# Patient Record
Sex: FEMALE | Race: BLACK OR AFRICAN AMERICAN | NOT HISPANIC OR LATINO | Employment: UNEMPLOYED | URBAN - METROPOLITAN AREA
[De-identification: names, ages, dates, MRNs, and addresses within clinical notes are randomized per-mention and may not be internally consistent; named-entity substitution may affect disease eponyms.]

---

## 2019-04-15 ENCOUNTER — TRANSCRIBE ORDERS (OUTPATIENT)
Dept: PHYSICAL THERAPY | Facility: HOSPITAL | Age: 74
End: 2019-04-15

## 2019-04-15 ENCOUNTER — HOSPITAL ENCOUNTER (OUTPATIENT)
Dept: PHYSICAL THERAPY | Facility: HOSPITAL | Age: 74
Setting detail: THERAPIES SERIES
Discharge: HOME | End: 2019-04-15

## 2019-04-15 DIAGNOSIS — M70.60 TENDINITIS OF TROCHANTERIC REGION OF HIP, UNSPECIFIED LATERALITY: Primary | ICD-10-CM

## 2019-04-15 DIAGNOSIS — M47.816 LUMBAR SPONDYLOSIS: ICD-10-CM

## 2019-04-15 PROCEDURE — 97161 PT EVAL LOW COMPLEX 20 MIN: CPT

## 2019-04-15 NOTE — THERAPY EVALUATION
Outpatient Physical Therapy Ortho Initial Evaluation  [facility]     Patient Name: Melody Rico  : 1945  MRN: [id number]  Today's Date: 4/15/2019      Visit Date: 04/15/2019    There is no problem list on file for this patient.       No past medical history on file.     No past surgical history on file.    Visit Dx:     ICD-10-CM ICD-9-CM   1. Tendinitis of trochanteric region of hip, unspecified laterality M70.60 726.5   2. Lumbar spondylosis M47.816 721.3         Patient History     Row Name 04/15/19 0828             History    Chief Complaint  Pain  -AC      Type of Pain  Back pain;Hip pain  -      Date Current Problem(s) Began  -- 2016  -      Brief Description of Current Complaint  Pt had onset of L hip pain in 2016, and eventually spread to low back and bilateral hips.  Aggravated by needing to perform a bowel movement, and states it takes her a couple days to pass.  Also has pain after passing a BM, and takes about 2 hours to resolve. Also has pain having to urinate, and feels she must go immediately.  Has a constant pain, aggravated by sharp turns in both backs and hips. Denies having any issues with BM/urinating. Has been walking with a cane for 1.5 years for balance.  States sometimes back and hip pain occurs separately.  Pt also reports intermittent nausea and vomiting, which also aggravates pain in mid through low back.  -AC      Previous treatment for THIS PROBLEM  Injections Injections helped temporarily;   -AC      Patient/Caregiver Goals  Relieve pain  -      Patient's Rating of General Health  Fair  -      Occupation/sports/leisure activities  Retired pie baker.  Pt is relatively sedentary, reads and does housework.  -AC      Patient seeing anyone else for problem(s)?  Rosibel Santos  -      How has patient tried to help current problem?  Heating pad (helps)  -      What clinical tests have you had for this problem?  X-ray;MRI  -      Results of Clinical Tests  Pt unsure  of what report said  -AC      History of Previous Related Injuries  None to lower quarter  -AC         Pain     Pain Location  Back;Hip  -AC      Pain at Present  8  -AC      Pain at Best  5  -AC      Pain at Worst  9  -AC      Pain Frequency  Intermittent  -AC      Pain Description  Sharp;Aching  -AC      What Performance Factors Make the Current Problem(s) WORSE?  Standing >10 minutes, having to perform a BM, walking >30 mins, sitting without lumbar support  -AC      What Performance Factors Make the Current Problem(s) BETTER?  Sitting with lumbar support; rest  -AC      Tolerance Time- Standing  10 mins  -AC      Is your sleep disturbed?  Yes Some nights if aggravated  -AC      Is medication used to assist with sleep?  No  -AC      Total hours of sleep per night  6 hours  -AC      What position do you sleep in?  Left sidelying Hurts R hip to lie on R side  -AC      Difficulties with ADL's?  Standing to do dishes (take breaks), increased time to bathe (doesn't shower d/t 2 falls)  -AC         Fall Risk Assessment    Any falls in the past year:  Yes  -AC      Number of falls reported in the last 12 months  2  -AC      Factors that contributed to the fall:  Slippery surface Getting out of bath tub while showering  -AC      Does patient have a fear of falling  Yes (comment)  -AC         Daily Activities    Primary Language  English  -AC      Are you able to read  Yes  -AC      Are you able to write  Yes  -AC      How does patient learn best?  Reading;Listening;Demonstration  -AC      Barriers to learning  Hearing  -AC      Pt Participated in POC and Goals  Yes  -AC         Safety    Are you being hurt, hit, or frightened by anyone at home or in your life?  No  -AC      Are you being neglected by a caregiver  No  -AC        User Key  (r) = Recorded By, (t) = Taken By, (c) = Cosigned By    Initials Name Provider Type    AC Chayo Thomas, PT Physical Therapist          PT Ortho     Row Name 04/15/19 0890        "Posture/Observations    Alignment Options  Lumbar lordosis  -AC    Lumbar lordosis  Decreased  -AC       Quarter Clearing    Quarter Clearing  Lower Quarter Clearing  -AC       DTR- Lower Quarter Clearing    Patellar tendon (L2-4)  Bilateral:;1- Minimal response  -AC    Achilles tendon (S1-2)  Bilateral:;1- Minimal response  -AC       Neural Tension Signs- Lower Quarter Clearing    Slump  Right:;Negative;Left:;Positive \"needle\" pain in L buttock w/ DF  -AC    SLR  Bilateral:;Negative LLE decreased HS flexibility  -AC       Sensory Screen for Light Touch- Lower Quarter Clearing    L1 (inguinal area)  Bilateral:;Intact  -AC    L2 (anterior mid thigh)  Bilateral:;Intact  -AC    L3 (distal anterior thigh)  Bilateral:;Intact  -AC    L4 (medial lower leg/foot)  Bilateral:;Intact  -AC    L5 (lateral lower leg/great toe)  Left:;Diminished;Right:;Intact  -AC    S1 (bottom of foot)  Right:;Diminished;Left:;Intact  -AC       Myotomal Screen- Lower Quarter Clearing    Hip flexion (L2)  Right:;3+ (Fair +);Left:;4 (Good)  -AC    Knee extension (L3)  Bilateral:;4 (Good)  -AC    Ankle DF (L4)  Bilateral:;4+ (Good +)  -AC    Great toe extension (L5)  Right:;3- (Fair -);Left:;3 (Fair)  -AC    Knee flexion (S2)  Right:;4 (Good);Left:;4- (Good -)  -AC       Lumbar ROM Screen- Lower Quarter Clearing    Lumbar Flexion  Impaired 75% ROM, HS limitation  -AC    Lumbar Extension  Impaired Mod impaired, incr'd LBP  -AC    Lumbar Lateral Flexion  Impaired Mildly limited ROM L, pain L hip  -AC    Lumbar Rotation  Impaired Mild limitation  -AC       SI/Hip Screen- Lower Quarter Clearing    Belkis's/Faraz's test  Right:;Positive Pain in buttock  -AC       Special Tests/Palpation    Special Tests/Palpation  Hip;Lumbar/SI  -AC       Lumbosacral Palpation    Lumbosacral Palpation?  Yes  -AC    Lumbosacral Segment  Tender  -AC    Spinous Process  Tender L4, L5  -AC       Hip/Thigh Palpation    Hip/Thigh Palpation?  Yes  -AC    Greater Trochanter  " Left:;Tender Exquisite TTP  -AC    Piriformis  Left:;Tender;Guarded/taut Exquisite TTP  -AC       Hip Special Tests    Stinchfield test (hip vs back pathology)  Bilateral:;Positive Incr'd buttock pain bilaterally  -AC       General ROM    GENERAL ROM COMMENTS  Hip screen: decreased L hip ROM in all directions  -AC       MMT (Manual Muscle Testing)    Rt Lower Ext  Rt Hip Extension;Rt Hip ABduction;Rt Hip Internal (Medial) Rotation;Rt Hip External (Lateral) Rotation  -AC    Lt Lower Ext  Lt Hip Extension;Lt Hip ABduction;Lt Hip Internal (Medial) Rotation;Lt Hip External (Lateral) Rotation  -AC       MMT Right Lower Ext    Rt Hip Extension MMT, Gross Movement  (3-/5) fair minus  -AC    Rt Hip ABduction MMT, Gross Movement  (4-/5) good minus  -AC    Rt Hip Internal (Medial) Rotation MMT, Gross Movement  (4+/5) good plus  -AC    Rt Hip External (Lateral) Rotation MMT, Gross Movement  (3+/5) fair plus  -AC       MMT Left Lower Ext    Lt Hip Extension MMT, Gross Movement  (3+/5) fair plus  -AC    Lt Hip ABduction MMT, Gross Movement  (4-/5) good minus  -AC    Lt Hip Internal (Medial) Rotation MMT, Gross Movement  (4+/5) good plus  -AC    Lt Hip External (Lateral) Rotation MMT, Gross Movement  (4-/5) good minus  -AC       Flexibility    Flexibility Tested?  Lower Extremity  -AC       Lower Extremity Flexibility    Hamstrings  Left:;Moderately limited  -AC    Gastrocnemius  Bilateral:;Mildly limited  -AC       Gait/Stairs Assessment/Training    Comment (Gait/Stairs)  Pt ambulates with straight cane in RUE, with decr'd step length on RLE and antalgic gait pattern  -AC      User Key  (r) = Recorded By, (t) = Taken By, (c) = Cosigned By    Initials Name Provider Type    AC Chayo Thomas, PT Physical Therapist        Therapy Education  Education Details: HEP provided including: LTRs, bridges, clamshells, and OMERO press-ups from forearm.  Given: HEP  Program: New  How Provided: Verbal, Demonstration, Written  Provided to:  Patient  Level of Understanding: Verbalized     PT OP Goals     Row Name 04/15/19 0828          PT Short Term Goals    STG Date to Achieve  05/06/19  -AC     STG 1  Decrease LBP/hip pain by > or = 50%.  -AC     STG 1 Progress  New  -AC     STG 2  Perform independent HEP to improve ROM/strength and manage symptoms.  -AC     STG 2 Progress  New  -AC     STG 3  Improve gross hip strength to at least 4/5.  -AC     STG 3 Progress  New  -AC        Long Term Goals    LTG Date to Achieve  05/27/19  -AC     LTG 1  Decrease LBP/hip pain by > or = 75%.  -AC     LTG 1 Progress  New  -AC     LTG 2  Improve Ez to < or = 40% to reflect significant improvement in performance of daily tasks.  -AC     LTG 2 Progress  New  -AC     LTG 3  Enable ability to stand to fully wash dishes with < or = 3/10 pain.  -AC     LTG 3 Progress  New  -AC        Time Calculation    PT Goal Re-Cert Due Date  07/14/19  -       User Key  (r) = Recorded By, (t) = Taken By, (c) = Cosigned By    Initials Name Provider Type    AC Chayo Thomas, PT Physical Therapist          PT Assessment/Plan     Row Name 04/15/19 0828          PT Assessment    Functional Limitations  Impaired gait;Limitation in home management;Performance in leisure activities;Performance in self-care ADL;Limitations in community activities  -AC     Impairments  Gait;Range of motion;Pain;Joint mobility;Sensation;Peripheral nerve integrity;Posture;Muscle strength;Impaired muscle length;Impaired muscle power  -AC     Assessment Comments  Pt presents with evolving symptoms of low complexity, with signs and symptoms consistent with diagnoses.  Pt has associated limitations in ROM, myotomal strength along L5-S1 distribution, and functional mobility (54% disability as per Ez).  PT intervention is warranted to improve mobility/strength and return to PLOF.  -AC     Please refer to paper survey for additional self-reported information  Yes  -AC     Rehab Potential  Good  -AC      Patient/caregiver participated in establishment of treatment plan and goals  Yes  -AC     Patient would benefit from skilled therapy intervention  Yes  -AC        PT Plan    PT Frequency  1x/week  -AC     Predicted Duration of Therapy Intervention (Therapy Eval)  12 visits  -AC     Planned CPT's?  PT EVAL LOW COMPLEXITY: 78305;PT THER PROC EA 15 MIN: 44990;PT MANUAL THERAPY EA 15 MIN: 66276;PT GAIT TRAINING EA 15 MIN: 70371;PT HOT OR COLD PACK TREAT MCARE;PT SELF CARE/MGMT/TRAIN 15 MIN: 25340;PT THER SUPP EA 15 MIN;PT THER MASS EA 15 MIN: 92341;PT ELECTRICAL STIM UNATTEND: ;PT NEUROMUSC RE-EDUCATION EA 15 MIN: 37032;PT THER ACT EA 15 MIN: 87138;PT RE-EVAL: 06275  -AC     PT Plan Comments  Progress exercises with care, and assess response to press-ups.  Manual and modalities as needed.  -AC       User Key  (r) = Recorded By, (t) = Taken By, (c) = Cosigned By    Initials Name Provider Type    AC Chayo Thomas, PT Physical Therapist        Outcome Measure Options: Modifed Owestry  Modified Oswestry  Modified Oswestry Score/Comments: 54%    Time Calculation:     Start Time: 0828     Therapy Charges for Today     Code Description Service Date Service Provider Modifiers Qty    77783539542  PT EVAL LOW COMPLEXITY 4 4/15/2019 Chayo Thomas, PT GP 1        PT G-Codes  Outcome Measure Options: Modifed Owestry  Modified Oswestry Score/Comments: 54%         Chayo Thomas PT  4/15/2019

## 2019-04-29 ENCOUNTER — HOSPITAL ENCOUNTER (OUTPATIENT)
Dept: PHYSICAL THERAPY | Facility: HOSPITAL | Age: 74
Setting detail: THERAPIES SERIES
Discharge: HOME | End: 2019-04-29

## 2019-04-29 DIAGNOSIS — M70.60 TENDINITIS OF TROCHANTERIC REGION OF HIP, UNSPECIFIED LATERALITY: Primary | ICD-10-CM

## 2019-04-29 DIAGNOSIS — M47.816 LUMBAR SPONDYLOSIS: ICD-10-CM

## 2019-04-29 PROCEDURE — 97110 THERAPEUTIC EXERCISES: CPT

## 2019-04-29 PROCEDURE — 97140 MANUAL THERAPY 1/> REGIONS: CPT

## 2019-04-29 NOTE — THERAPY TREATMENT NOTE
Outpatient Physical Therapy Ortho Treatment Note  [facility]     Patient Name: Melody Rico  : 1945  MRN: [id number]  Today's Date: 2019      Visit Date: 2019    Visit Dx:    ICD-10-CM ICD-9-CM   1. Tendinitis of trochanteric region of hip, unspecified laterality M70.60 726.5   2. Lumbar spondylosis M47.816 721.3       There is no problem list on file for this patient.       No past medical history on file.     No past surgical history on file.    PT Assessment/Plan     Row Name 19 1420          PT Assessment    Assessment Comments  Pt tolerated exercises for lumbar/pelvic girdle well with mild UE support needed for STSs.  Pt was exquisitely TTP to lumbar segments initially with manual therapy, however it and ther ex reduced pain levels.  Pt's pain appears to be not solely mechanical/musculoskeletal-related, however, as eating consistently reproduces back pain.   -AC        PT Plan    PT Plan Comments  Progress exercises with care, with manual therapy and modalities as needed.  -AC       User Key  (r) = Recorded By, (t) = Taken By, (c) = Cosigned By    Initials Name Provider Type    AC Chayo Thomas, PT Physical Therapist          Exercises     Row Name 19 1427             Subjective Comments    Subjective Comments  Pt states she is still having back pain that starts in her neck and runs down to her low back. This pain occurs after eating and also with standing to do dishes.  Does not eat sometimes to try to avoid pain, but is going in for labs/MRI this week. Heating pad helps.  -AC         Subjective Pain    Able to rate subjective pain?  yes  -AC      Pre-Treatment Pain Level  8  -AC      Post-Treatment Pain Level  6  -AC         Total Minutes    85681 - PT Therapeutic Exercise Minutes  30  -AC      73194 - PT Manual Therapy Minutes  10  -AC         Exercise 1    Exercise Name 1  Performed NuStep 5', LTRs, bridges, clamshells, STSs with red band, PPTs, and ball roll-outs  forward/jamaal right.  -AC      Cueing 1  Verbal;Tactile;Demo  -AC      Time 1  30  -AC      Additional Comments  Ther Ex  -AC        User Key  (r) = Recorded By, (t) = Taken By, (c) = Cosigned By    Initials Name Provider Type    AC Chayo Thomas, PT Physical Therapist           Manual Rx (last 36 hours)      Manual Treatments     Row Name 04/29/19 1420             Total Minutes    85063 - PT Manual Therapy Minutes  10  -AC         Manual Rx 1    Manual Rx 1 Location  Lumbar spine  -AC      Manual Rx 1 Type  Small-range PA mobilizations to lumbar spine with pt in sidelying  -AC      Manual Rx 1 Grade  II  -AC      Manual Rx 1 Duration  10  -AC        User Key  (r) = Recorded By, (t) = Taken By, (c) = Cosigned By    Initials Name Provider Type    Chayo Angulo, PT Physical Therapist        Time Calculation:   Start Time: 1420  Therapy Charges for Today     Code Description Service Date Service Provider Modifiers Qty    31018109420  PT THER PROC EA 15 MIN 4/29/2019 Chayo Thomas, PT GP 2    [id number]  PT MANUAL THERAPY EA 15 MIN 4/29/2019 Chayo Thomas, PT GP 1        Chayo Thomas PT  4/29/2019

## 2019-05-07 ENCOUNTER — HOSPITAL ENCOUNTER (OUTPATIENT)
Dept: PHYSICAL THERAPY | Facility: HOSPITAL | Age: 74
Setting detail: THERAPIES SERIES
Discharge: HOME | End: 2019-05-07

## 2019-05-07 DIAGNOSIS — M70.60 TENDINITIS OF TROCHANTERIC REGION OF HIP, UNSPECIFIED LATERALITY: Primary | ICD-10-CM

## 2019-05-07 DIAGNOSIS — M47.816 LUMBAR SPONDYLOSIS: ICD-10-CM

## 2019-05-07 PROCEDURE — 97110 THERAPEUTIC EXERCISES: CPT

## 2019-05-07 NOTE — THERAPY TREATMENT NOTE
Outpatient Physical Therapy Ortho Treatment Note  [facility]     Patient Name: Melody Rico  : 1945  MRN: [id number]  Today's Date: 2019      Visit Date: 2019    Visit Dx:    ICD-10-CM ICD-9-CM   1. Tendinitis of trochanteric region of hip, unspecified laterality M70.60 726.5   2. Lumbar spondylosis M47.816 721.3       There is no problem list on file for this patient.       No past medical history on file.     No past surgical history on file.    PT Assessment/Plan     Row Name 19 0845          PT Assessment    Assessment Comments  Pt tolerated continuation of therex well without c/o pain. Did not include manual therapy today, as it seems this may have aggravated her symptoms after prior session.  Pt states forward flexion tends to provide mild improvement in symptoms.  -AC        PT Plan    PT Plan Comments  Continue trunk/pelvic girdle strengthening/mobility exercises as tolerated; incorporate modalities as needed.  -AC       User Key  (r) = Recorded By, (t) = Taken By, (c) = Cosigned By    Initials Name Provider Type    AC Chayo Thomas, PT Physical Therapist        Exercises     Row Name 19 0845             Subjective Comments    Subjective Comments  Pt states she is having mid-low back pain today. Has continued to be reproduced with eating, and woke up with this pain on Saturday night. Is getting an MRI on May 25th and a scope on . Went to pain management last week, who recommended she continue PT and undergo testing before they intervene. Felt better after previous session until she went home, and pain returned.  -AC         Subjective Pain    Able to rate subjective pain?  yes  -AC      Pre-Treatment Pain Level  7  -AC      Post-Treatment Pain Level  6  -AC         Total Minutes    90186 - PT Therapeutic Exercise Minutes  42  -AC         Exercise 1    Exercise Name 1  Performed NuStep L5 5'; LTRs with ball x15, sidelying rotation stretch 10x ea; bridges x10  with band, clamshells x10 ea red TB; PPTs x15; STS 2x10 (1st set with PT UE support; 2nd set from elevated surface/arms crossed); and ball roll outs (10/10/10).   -AC      Cueing 1  Verbal;Tactile;Demo  -AC      Time 1  42  -AC      Additional Comments  Ther Ex  -AC        User Key  (r) = Recorded By, (t) = Taken By, (c) = Cosigned By    Initials Name Provider Type    AC Chayo Thomas, PT Physical Therapist        Time Calculation:   Start Time: 0845  Therapy Charges for Today     Code Description Service Date Service Provider Modifiers Qty    44891065583 HC PT THER PROC EA 15 MIN 5/7/2019 Chayo Thomas, PT GP 3        Chayo Thomas PT  5/7/2019

## 2019-05-14 ENCOUNTER — HOSPITAL ENCOUNTER (OUTPATIENT)
Dept: PHYSICAL THERAPY | Facility: HOSPITAL | Age: 74
Setting detail: THERAPIES SERIES
Discharge: HOME | End: 2019-05-14

## 2019-05-14 DIAGNOSIS — M70.60 TENDINITIS OF TROCHANTERIC REGION OF HIP, UNSPECIFIED LATERALITY: Primary | ICD-10-CM

## 2019-05-14 DIAGNOSIS — M47.816 LUMBAR SPONDYLOSIS: ICD-10-CM

## 2019-05-14 PROCEDURE — 97110 THERAPEUTIC EXERCISES: CPT

## 2019-05-14 NOTE — THERAPY TREATMENT NOTE
Outpatient Physical Therapy Ortho Progress Note   [place]     Patient Name: Melody Rico  : 1945  MRN: [id number]  Today's Date: 2019      Visit Date: 2019    Visit Dx:    ICD-10-CM ICD-9-CM   1. Tendinitis of trochanteric region of hip, unspecified laterality M70.60 726.5   2. Lumbar spondylosis M47.816 721.3       There is no problem list on file for this patient.       No past medical history on file.     No past surgical history on file.    PT Ortho     Row Name 19 0831       Neural Tension Signs- Lower Quarter Clearing    Slump  Bilateral:;Negative Incr'd tension noted in RLE  -AC    SLR  Bilateral:;Negative Incr'd HS limitation in RLE  -AC       Myotomal Screen- Lower Quarter Clearing    Hip flexion (L2)  Right:;4+ (Good +);Left:;4- (Good -)  -AC       Lumbar ROM Screen- Lower Quarter Clearing    Lumbar Flexion  Impaired 75% with R sided back pain & HS limitation  -AC    Lumbar Extension  Impaired Moderately limited increased R pain  -AC    Lumbar Lateral Flexion  Impaired Decr'd to L; R incr'd L LBP  -AC    Lumbar Rotation  Impaired R WNL with L pain; decr'd L ROM  -AC       Hip/Thigh Palpation    Greater Trochanter  Bilateral:;Tender  -AC       MMT Right Lower Ext    Rt Hip Extension MMT, Gross Movement  (4-/5) good minus  -AC    Rt Hip ABduction MMT, Gross Movement  (3-/5) fair minus Incr'd hip pain  -AC       MMT Left Lower Ext    Lt Hip Extension MMT, Gross Movement  (3-/5) fair minus  -AC    Lt Hip ABduction MMT, Gross Movement  (4-/5) good minus Incr'd hip pain  -AC      User Key  (r) = Recorded By, (t) = Taken By, (c) = Cosigned By    Initials Name Provider Type    Chayo Angulo, PT Physical Therapist        PT Assessment/Plan     Row Name 19 0831          PT Assessment    Functional Limitations  Impaired gait;Performance in leisure activities;Limitation in home management;Limitations in community activities;Performance in self-care ADL  -AC      Impairments  Pain;Range of motion;Muscle strength;Joint integrity;Impaired muscle endurance;Gait;Sensation  -AC     Assessment Comments  Pt reports minimal improvement in symptoms over the course of treatment thus far.  Pt has signs indicating bilateral trochanteric bursitis, however back pain does not present as being fully mechanical.  Pt states she has an altered taste sensation as a precursor to back/bilateral flank pain, that is consistently aggravated by eating.  She reports difficulty performing BMs, but once she has a BM she has temporary relief in back pain. Has also noted numbness/pain in bilateral feet as of two weeks ago while sitting/lying in bed, as well as swelling in her feet. I provided pt a handout of exercises to address bilateral hip pain, however advised we hold PT at this time for her back until she undergoes an MRI on the 25th. She will call after that time and we will proceed as indicated.   -AC     Please refer to paper survey for additional self-reported information  Yes  -AC     Rehab Potential  Good  -AC     Patient/caregiver participated in establishment of treatment plan and goals  Yes  -AC     Patient would benefit from skilled therapy intervention  Yes  -AC        PT Plan    PT Frequency  1x/week  -AC     Predicted Duration of Therapy Intervention (Therapy Eval)  6 weeks  -AC     Planned CPT's?  PT EVAL LOW COMPLEXITY: 21020;PT THER PROC EA 15 MIN: 13190;PT MANUAL THERAPY EA 15 MIN: 01520;PT GAIT TRAINING EA 15 MIN: 96950;PT HOT OR COLD PACK TREAT MCARE;PT HOT/COLD PACK WC NONMCARE: 76630;PT SELF CARE/MGMT/TRAIN 15 MIN: 40656;PT THER SUPP EA 15 MIN;PT THER MASS EA 15 MIN: 27016;PT IONTOPHORESIS EA 15 MIN: 21162;PT ULTRASOUND EA 15 MIN: 47381;PT NEUROMUSC RE-EDUCATION EA 15 MIN: 89964;PT THER ACT EA 15 MIN: 70249;PT RE-EVAL: 73705  -AC     PT Plan Comments  Hold therapy at this time, following up as indicated after MRI on 5/25.  -AC       User Key  (r) = Recorded By, (t) = Taken By,  "(c) = Cosigned By    Initials Name Provider Type    AC Chayo Thomas, PT Physical Therapist        Exercises     Row Name 05/14/19 0831             Subjective Comments    Subjective Comments  Pt states she was having a lot of pain in R low back radiating around to anterior R abdomen yesterday and today.  Has noticed a \"nasty\" taste in her mouth as a precursor to pain. Ate a sandwich last night, and pain began in R upper back and ran down her back. States that exercises at home have helped a little.   -AC         Subjective Pain    Able to rate subjective pain?  yes  -AC      Pre-Treatment Pain Level  6  -AC      Post-Treatment Pain Level  4  -AC         Total Minutes    57881 - PT Therapeutic Exercise Minutes  45  -AC         Exercise 1    Exercise Name 1  Reassessment measures included in therex time. Discussed pt's symptoms at length and provided HEP to address bilateral hip pain: bridges with band, clamshells, standing hip ABD/EXT, and side steps (green TB provided).  -AC      Cueing 1  Verbal;Tactile;Demo  -AC      Time 1  45  -AC      Additional Comments  Ther Ex  -AC        User Key  (r) = Recorded By, (t) = Taken By, (c) = Cosigned By    Initials Name Provider Type    AC Chayo Thomas, PT Physical Therapist        PT OP Goals     Row Name 05/14/19 0831          PT Short Term Goals    STG Date to Achieve  05/06/19  -     STG 1  Decrease LBP/hip pain by > or = 50%.  -     STG 1 Progress  Ongoing  -     STG 2  Perform independent HEP to improve ROM/strength and manage symptoms.  -     STG 2 Progress  Partially Met  -     STG 3  Improve gross hip strength to at least 4/5.  -     STG 3 Progress  Ongoing  -        Long Term Goals    LTG Date to Achieve  05/27/19  -     LTG 1  Decrease LBP/hip pain by > or = 75%.  -     LTG 1 Progress  Ongoing  -     LTG 2  Improve Ez to < or = 40% to reflect significant improvement in performance of daily tasks.  -     LTG 2 Progress  Ongoing " Increase to 64%  -AC     LTG 3  Enable ability to stand to fully wash dishes with < or = 3/10 pain.  -AC     LTG 3 Progress  Ongoing  -AC     LTG 3 Progress Comments  Still has to take breaks and bend over sink for relief  -AC        Time Calculation    PT Goal Re-Cert Due Date  07/14/19  -AC       User Key  (r) = Recorded By, (t) = Taken By, (c) = Cosigned By    Initials Name Provider Type    AC Chayo Thomas, PT Physical Therapist        Outcome Measure Options: Modifed Owestry  Modified Oswestry  Modified Oswestry Score/Comments: 64%    Time Calculation:   Start Time: 0831  Therapy Charges for Today     Code Description Service Date Service Provider Modifiers Qty    63110239349 HC PT THER PROC EA 15 MIN 5/14/2019 Chayo Thomas, PT GP 3        PT G-Codes  Outcome Measure Options: Modifed Owestry  Modified Oswestry Score/Comments: 64%     Chayo Thomas, PT  5/14/2019

## 2019-08-12 ENCOUNTER — DOCUMENTATION (OUTPATIENT)
Dept: PHYSICAL THERAPY | Facility: HOSPITAL | Age: 74
End: 2019-08-12

## 2019-08-12 DIAGNOSIS — M70.60 TENDINITIS OF TROCHANTERIC REGION OF HIP, UNSPECIFIED LATERALITY: Primary | ICD-10-CM

## 2019-08-12 NOTE — THERAPY DISCHARGE NOTE
Outpatient Physical Therapy Discharge Summary         Patient Name: Melody Rico  : 1945  MRN: [id number]    Today's Date: 2019    Visit Dx:    ICD-10-CM ICD-9-CM   1. Tendinitis of trochanteric region of hip, unspecified laterality M70.60 726.5       PT OP Goals     Row Name 19 0800          PT Short Term Goals    STG Date to Achieve  19  -CR     STG 1  Decrease LBP/hip pain by > or = 50%.  -CR     STG 1 Progress  Ongoing  -CR     STG 2  Perform independent HEP to improve ROM/strength and manage symptoms.  -CR     STG 2 Progress  Partially Met  -CR     STG 3  Improve gross hip strength to at least 4/5.  -CR     STG 3 Progress  Ongoing  -CR        Long Term Goals    LTG Date to Achieve  19  -CR     LTG 1  Decrease LBP/hip pain by > or = 75%.  -CR     LTG 1 Progress  Ongoing  -CR     LTG 2  Improve Ez to < or = 40% to reflect significant improvement in performance of daily tasks.  -CR     LTG 2 Progress  Ongoing Increase to 64%  -CR     LTG 3  Enable ability to stand to fully wash dishes with < or = 3/10 pain.  -CR     LTG 3 Progress  Ongoing  -CR       User Key  (r) = Recorded By, (t) = Taken By, (c) = Cosigned By    Initials Name Provider Type    Marques Cho, PT Physical Therapist          OP PT Discharge Summary  Date of Discharge: 19  Discharge Instructions/Additional Comments: Client last followed 2019.  Client discharged at this time.            Marques Wheeler PT  2019